# Patient Record
Sex: MALE
[De-identification: names, ages, dates, MRNs, and addresses within clinical notes are randomized per-mention and may not be internally consistent; named-entity substitution may affect disease eponyms.]

---

## 2019-02-04 ENCOUNTER — HOSPITAL ENCOUNTER (INPATIENT)
Dept: HOSPITAL 5 - ED | Age: 29
LOS: 1 days | Discharge: HOME | DRG: 918 | End: 2019-02-05
Attending: INTERNAL MEDICINE | Admitting: INTERNAL MEDICINE
Payer: SELF-PAY
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
## 2019-02-04 DIAGNOSIS — Z71.6: ICD-10-CM

## 2019-02-04 DIAGNOSIS — T40.601A: Primary | ICD-10-CM

## 2019-02-04 DIAGNOSIS — F17.210: ICD-10-CM

## 2019-02-04 LAB
ALBUMIN SERPL-MCNC: 4.2 G/DL (ref 3.9–5)
ALT SERPL-CCNC: 15 UNITS/L (ref 7–56)
BASOPHILS # (AUTO): 0.1 K/MM3 (ref 0–0.1)
BASOPHILS NFR BLD AUTO: 0.4 % (ref 0–1.8)
BENZODIAZEPINES SCREEN,URINE: (no result)
BUN SERPL-MCNC: 12 MG/DL (ref 9–20)
BUN/CREAT SERPL: 15 %
CALCIUM SERPL-MCNC: 8.6 MG/DL (ref 8.4–10.2)
EOSINOPHIL # BLD AUTO: 0.1 K/MM3 (ref 0–0.4)
EOSINOPHIL NFR BLD AUTO: 0.6 % (ref 0–4.3)
HCT VFR BLD CALC: 41.7 % (ref 35.5–45.6)
HEMOLYSIS INDEX: 5
HGB BLD-MCNC: 13.7 GM/DL (ref 11.8–15.2)
LYMPHOCYTES # BLD AUTO: 1.2 K/MM3 (ref 1.2–5.4)
LYMPHOCYTES NFR BLD AUTO: 9.2 % (ref 13.4–35)
MCHC RBC AUTO-ENTMCNC: 33 % (ref 32–34)
MCV RBC AUTO: 88 FL (ref 84–94)
METHADONE SCREEN,URINE: (no result)
MONOCYTES # (AUTO): 0.9 K/MM3 (ref 0–0.8)
MONOCYTES % (AUTO): 6.9 % (ref 0–7.3)
OPIATE SCREEN,URINE: (no result)
PLATELET # BLD: 170 K/MM3 (ref 140–440)
RBC # BLD AUTO: 4.76 M/MM3 (ref 3.65–5.03)

## 2019-02-04 PROCEDURE — 93010 ELECTROCARDIOGRAM REPORT: CPT

## 2019-02-04 PROCEDURE — 93005 ELECTROCARDIOGRAM TRACING: CPT

## 2019-02-04 PROCEDURE — G0480 DRUG TEST DEF 1-7 CLASSES: HCPCS

## 2019-02-04 PROCEDURE — 80320 DRUG SCREEN QUANTALCOHOLS: CPT

## 2019-02-04 PROCEDURE — 82550 ASSAY OF CK (CPK): CPT

## 2019-02-04 PROCEDURE — 80307 DRUG TEST PRSMV CHEM ANLYZR: CPT

## 2019-02-04 PROCEDURE — 93306 TTE W/DOPPLER COMPLETE: CPT

## 2019-02-04 PROCEDURE — 80053 COMPREHEN METABOLIC PANEL: CPT

## 2019-02-04 PROCEDURE — 85025 COMPLETE CBC W/AUTO DIFF WBC: CPT

## 2019-02-04 PROCEDURE — 36415 COLL VENOUS BLD VENIPUNCTURE: CPT

## 2019-02-04 PROCEDURE — 82553 CREATINE MB FRACTION: CPT

## 2019-02-04 PROCEDURE — 83735 ASSAY OF MAGNESIUM: CPT

## 2019-02-04 PROCEDURE — 84484 ASSAY OF TROPONIN QUANT: CPT

## 2019-02-04 PROCEDURE — 99406 BEHAV CHNG SMOKING 3-10 MIN: CPT

## 2019-02-04 RX ADMIN — DEXTROSE AND SODIUM CHLORIDE SCH MLS/HR: 5; .9 INJECTION, SOLUTION INTRAVENOUS at 21:58

## 2019-02-04 RX ADMIN — Medication SCH ML: at 21:59

## 2019-02-04 RX ADMIN — FAMOTIDINE SCH: 10 INJECTION, SOLUTION INTRAVENOUS at 21:59

## 2019-02-04 NOTE — EMERGENCY DEPARTMENT REPORT
HPI





- General


Chief Complaint: Overdose


Time Seen by Provider: 02/04/19 14:48





- HPI


HPI: 





Room 10





The patient is a 28-year-old male presenting with chief complaint of opiate 

overdose.  The patient states is prior to arrival he had bought one 30 mg 

oxycodone and crushed and snorted it.  Patient states began to have confused 

speech felt lightheaded.  The patient's brother states he became pale his lips 

turned purple the patient passed out.  EMS was called.  EMS administered Narcan 

the patient began to come to.  Patient had episode of vomiting with EMS but now 

denies complaints





Location: [See above]


Duration: [See above]


Quality:  [See above]


Severity:  [See above]


Modifying factors: [see above]


Context: [see above]


Mode of transportation: [not driving]





ED Past Medical Hx





- Past Medical History


Previous Medical History?: No





- Surgical History


Past Surgical History?: No





- Family History


Family history: no significant





- Social History


Smoking Status: Former Smoker


Substance Use Type: Other (history of opiate abuse)





ED Review of Systems


ROS: 


Stated complaint: OVERDOSE


Other details as noted in HPI





Constitutional: no symptoms reported


Eyes: denies: eye pain


ENT: denies: throat pain


Respiratory: no symptoms reported


Cardiovascular: denies: chest pain


Endocrine: no symptoms reported


Gastrointestinal: denies: abdominal pain


Genitourinary: denies: dysuria


Musculoskeletal: denies: back pain


Neurological: denies: headache





Physical Exam





- Physical Exam


Vital Signs: 


                                   Vital Signs











  02/04/19





  14:30


 


Temperature 97.4 F L


 


Pulse Rate 75


 


Respiratory 18





Rate 


 


Blood Pressure 119/67





[Right] 


 


O2 Sat by Pulse 96





Oximetry 











Physical Exam: 





GENERAL: The patient is well-developed well-nourished male lying on stretcher 

not appearing to be in acute distress. []


HEENT: Normocephalic.  Atraumatic.  Extraocular motions are intact.  Patient has

 moist mucous membranes.


NECK: Supple.  Trachea midline


CHEST/LUNGS: Clear to auscultation.  There is no respiratory distress noted.


HEART/CARDIOVASCULAR: Regular.  There is no tachycardia.  There is no gallop rub

 or murmur.


ABDOMEN: Abdomen is soft, nontender.  Patient has normal bowel sounds.  There is

 no abdominal distention.


SKIN: There is no rash.  There is no edema.  There is no diaphoresis.


NEURO: The patient is awake, alert, and oriented.  The patient is cooperative.  

The patient has no focal neurologic deficits.  The patient has normal speech.  

Cranial nerves II through XII grossly intact, no drift


MUSCULOSKELETAL:  There is no evidence of acute injury.





ED Course


                                   Vital Signs











  02/04/19





  14:30


 


Temperature 97.4 F L


 


Pulse Rate 75


 


Respiratory 18





Rate 


 


Blood Pressure 119/67





[Right] 


 


O2 Sat by Pulse 96





Oximetry 














ED Medical Decision Making





- Lab Data


Result diagrams: 


                                 02/04/19 15:05





                                 02/04/19 15:05





                                Laboratory Tests











  02/04/19 02/04/19





  15:05 15:05


 


WBC  13.0 H 


 


RBC  4.76 


 


Hgb  13.7 


 


Hct  41.7 


 


MCV  88 


 


MCH  29 


 


MCHC  33 


 


RDW  12.7 L 


 


Plt Count  170 


 


Lymph % (Auto)  9.2 L 


 


Mono % (Auto)  6.9 


 


Eos % (Auto)  0.6 


 


Baso % (Auto)  0.4 


 


Lymph #  1.2 


 


Mono #  0.9 H 


 


Eos #  0.1 


 


Baso #  0.1 


 


Seg Neutrophils %  82.9 H 


 


Seg Neutrophils #  10.7 H 


 


Sodium   141


 


Potassium   4.4


 


Chloride   105.2


 


Carbon Dioxide   28


 


Anion Gap   12


 


BUN   12


 


Creatinine   0.8


 


Estimated GFR   > 60


 


BUN/Creatinine Ratio   15


 


Glucose   115 H


 


Calcium   8.6


 


Total Bilirubin   0.30


 


AST   16


 


ALT   15


 


Alkaline Phosphatase   57


 


CK-MB (CK-2)   2.0


 


Troponin T   < 0.010


 


Total Protein   6.6


 


Albumin   4.2


 


Albumin/Globulin Ratio   1.8














- EKG Data


-: EKG Interpreted by Me


EKG shows normal: sinus rhythm


Rate: bradycardia (59 bpm)





- EKG Data


When compared to previous EKG there are: previous EKG unavailable


Interpretation: other (trigeminy)





- Differential Diagnosis


opiate overdose


Critical care attestation.: 


If time is entered above; I have spent that time in minutes in the direct care 

of this critically ill patient, excluding procedure time.








ED Disposition


Clinical Impression: 


 Opiate overdose, Trigeminy





Disposition: DC-09 OP ADMIT IP TO THIS HOSP


Is pt being admited?: Yes


Does the pt Need Aspirin: Yes


Condition: Fair


Time of Disposition: 16:40 (hospitalist paged (Dr Dumont))

## 2019-02-05 VITALS — DIASTOLIC BLOOD PRESSURE: 71 MMHG | SYSTOLIC BLOOD PRESSURE: 114 MMHG

## 2019-02-05 LAB
ALBUMIN SERPL-MCNC: 3.3 G/DL (ref 3.9–5)
ALT SERPL-CCNC: 14 UNITS/L (ref 7–56)
BASOPHILS # (AUTO): 0.1 K/MM3 (ref 0–0.1)
BASOPHILS NFR BLD AUTO: 0.9 % (ref 0–1.8)
BUN SERPL-MCNC: 9 MG/DL (ref 9–20)
BUN/CREAT SERPL: 11 %
CALCIUM SERPL-MCNC: 8.3 MG/DL (ref 8.4–10.2)
EOSINOPHIL # BLD AUTO: 0.2 K/MM3 (ref 0–0.4)
EOSINOPHIL NFR BLD AUTO: 2.6 % (ref 0–4.3)
HCT VFR BLD CALC: 40.8 % (ref 35.5–45.6)
HEMOLYSIS INDEX: 16
HGB BLD-MCNC: 13.2 GM/DL (ref 11.8–15.2)
LYMPHOCYTES # BLD AUTO: 2.4 K/MM3 (ref 1.2–5.4)
LYMPHOCYTES NFR BLD AUTO: 32.9 % (ref 13.4–35)
MCHC RBC AUTO-ENTMCNC: 33 % (ref 32–34)
MCV RBC AUTO: 88 FL (ref 84–94)
MONOCYTES # (AUTO): 0.8 K/MM3 (ref 0–0.8)
MONOCYTES % (AUTO): 10.6 % (ref 0–7.3)
PLATELET # BLD: 179 K/MM3 (ref 140–440)
RBC # BLD AUTO: 4.61 M/MM3 (ref 3.65–5.03)

## 2019-02-05 RX ADMIN — Medication SCH ML: at 10:39

## 2019-02-05 RX ADMIN — FAMOTIDINE SCH MG: 10 INJECTION, SOLUTION INTRAVENOUS at 10:37

## 2019-02-05 RX ADMIN — DEXTROSE AND SODIUM CHLORIDE SCH MLS/HR: 5; .9 INJECTION, SOLUTION INTRAVENOUS at 06:55

## 2019-02-05 NOTE — CONSULTATION
History of Present Illness


Consult date: 02/05/19


Requesting physician: KRISTIE PRUITT


Consult reason: other (trigeminy)


History of present illness: 


The patient is a 28-year-old male with no known significant past medical 

history. He presenting with chief complaint of altered mental status and 

"turning blue" following ingestion of one 30 mg oxycodone tablet which he vernell

hed and snorted. He states that he does not recall what occurred after he took 

the tablet, but his brother reported that pt became confused and turned blue and

thus EMS was called. EMS administered Narcan the patient regained consciousness.

Patient had episode of vomiting with EMS but now denies any current complaints. 

He was noted to have some trigeminy upon arrival and thus cardiology has been c

onsulted. Trigeminy has since resolved, pt in NSR on telemetry. 








Past History


Past Medical History: No medical history


Past Surgical History: No surgical history


Social history: smoking (former tobacco), prescription drug abuse





Medications and Allergies


                                    Allergies











Allergy/AdvReac Type Severity Reaction Status Date / Time


 


No Known Allergies Allergy   Unverified 02/04/19 14:38











                                Home Medications











 Medication  Instructions  Recorded  Confirmed  Last Taken  Type


 


No Known Home Medications [No  02/04/19 02/04/19 Unknown History





Reported Home Medications]     











Active Meds: 


Active Medications





Acetaminophen (Tylenol)  650 mg PO Q4H PRN


   PRN Reason: Pain MILD(1-3)/Fever >100.5/HA


Famotidine (Pepcid)  20 mg IV BID [facility]


   Last Admin: 02/04/19 21:59 Dose:  Not Given


   Documented by: 


Dextrose/Sodium Chloride (D5ns)  1,000 mls @ 100 mls/hr IV AS DIRECT [facility]


   Last Admin: 02/05/19 06:55 Dose:  100 mls/hr


   Documented by: 


Ibuprofen (Motrin)  600 mg PO Q6H PRN


   PRN Reason: Pain, Mild (1-3)


Ondansetron HCl (Zofran)  4 mg IV Q8H PRN


   PRN Reason: Nausea And Vomiting


Sodium Chloride (Sodium Chloride Flush Syringe 10 Ml)  10 ml IV BID [facility]


   Last Admin: 02/04/19 21:59 Dose:  10 ml


   Documented by: 


Sodium Chloride (Sodium Chloride Flush Syringe 10 Ml)  10 ml IV PRN PRN


   PRN Reason: LINE FLUSH











Review of Systems


Constitutional: no weight loss, no weight gain, no fever, no chills, no sweats


Ears, nose, mouth and throat: no ear pain, no nose pain, no sinus pressure, no 

sinus pain


Cardiovascular: lightheadedness, no chest pain, no orthopnea, no palpitations, 

no edema, no syncope, no shortness of breath, no dyspnea on exertion, no high 

blood pressure, no leg edema, no decreased exercise tolerance


Respiratory: no cough, no shortness of breath, no dyspnea on exertion, no 

congestion, no wheezing, no pain on inspiration


Gastrointestinal: vomiting, no abdominal pain, no nausea, no diarrhea, no 

constipation, no change in bowel habits


Genitourinary Male: no dysuria, no hematuria, no flank pain, no discharge, no 

urinary frequency, no urinary hesitancy


Musculoskeletal: no neck stiffness, no neck pain, no shooting arm pain, no arm 

numbness/tingling, no low back pain, no shooting leg pain


Integumentary: no rash, no pruritis, no redness, no sores, no wounds


Neurological: change in speech, change in mentation, no head injury, no 

paralysis, no weakness, no parathesias, no numbness, no tingling, no seizures, 

no syncope


Psychiatric: no anxiety


Endocrine: no cold intolerance, no heat intolerance


Hematologic/Lymphatic: no easy bruising, no easy bleeding


Allergic/Immunologic: no urticaria, no wheezing





Physical Examination


                                   Vital Signs











Temp Pulse Resp BP Pulse Ox


 


 97.4 F L  75   18   119/67   96 


 


 02/04/19 14:30  02/04/19 14:30  02/04/19 14:30  02/04/19 14:30  02/04/19 14:30











General appearance: no acute distress


HEENT: Positive: PERRL, Normocephaly, Mucus Membranes Moist


Neck: Positive: neck supple, trachea midline


Cardiac: Positive: Reg Rate and Rhythm, S1/S2


Lungs: Positive: clear to auscultation


Neuro: Positive: Grossly Intact


Abdomen: Positive: Soft.  Negative: Tender


Skin: Positive: Clear.  Negative: Rash, Wound


Musculoskeletal: No Pain


Extremities: Absent: edema





Results





                                 02/05/19 05:04





                                 02/05/19 05:04


                                 Cardiac Enzymes











  02/04/19 02/05/19 Range/Units





  15:05 05:04 


 


AST  16  14  (5-40)  units/L


 


CK-MB (CK-2)  2.0   (0.0-4.0)  ng/mL








                                       CBC











  02/04/19 02/05/19 Range/Units





  15:05 05:04 


 


WBC  13.0 H  7.2  (4.5-11.0)  K/mm3


 


RBC  4.76  4.61  (3.65-5.03)  M/mm3


 


Hgb  13.7  13.2  (11.8-15.2)  gm/dl


 


Hct  41.7  40.8  (35.5-45.6)  %


 


Plt Count  170  179  (140-440)  K/mm3


 


Lymph #  1.2  2.4  (1.2-5.4)  K/mm3


 


Mono #  0.9 H  0.8  (0.0-0.8)  K/mm3


 


Eos #  0.1  0.2  (0.0-0.4)  K/mm3


 


Baso #  0.1  0.1  (0.0-0.1)  K/mm3








                          Comprehensive Metabolic Panel











  02/04/19 02/05/19 Range/Units





  15:05 05:04 


 


Sodium  141  142  (137-145)  mmol/L


 


Potassium  4.4  4.0  (3.6-5.0)  mmol/L


 


Chloride  105.2  105.8  ()  mmol/L


 


Carbon Dioxide  28  27  (22-30)  mmol/L


 


BUN  12  9  (9-20)  mg/dL


 


Creatinine  0.8  0.8  (0.8-1.5)  mg/dL


 


Glucose  115 H  97  ()  mg/dL


 


Calcium  8.6  8.3 L  (8.4-10.2)  mg/dL


 


AST  16  14  (5-40)  units/L


 


ALT  15  14  (7-56)  units/L


 


Alkaline Phosphatase  57  53  ()  units/L


 


Total Protein  6.6  5.8 L  (6.3-8.2)  g/dL


 


Albumin  4.2  3.3 L  (3.9-5)  g/dL














- Imaging and Cardiology


Echo: pending


EKG: report reviewed, image reviewed





EKG interpretations





- Telemetry


EKG Rhythm: Sinus Rhythm





- EKG


Sinus rhythms and dysrhythmias: sinus rhythm





Assessment and Plan


Currently stable cardiac status. Trigeminy currently resolved. Obtain echo and 

serum Mg. Pending echo and electrolytes WNL, pt may discharge home today from 

cardiology standpoint. 





The patient has been seen in conjunction with Dr. Ortega who agrees with the 

assessment and plan of care.








- Patient Problems


(1) Opiate overdose


Current Visit: Yes   Status: Acute   





(2) Trigeminy


Current Visit: Yes   Status: Acute

## 2019-02-05 NOTE — HISTORY AND PHYSICAL REPORT
CHIEF COMPLAINT:  Opiate overdose.



HISTORY OF PRESENT ILLNESS:  A 28-year-old  male presents with opiate

overdose.  Apparently, he took 30 mg of oxycodone and snorted it.  The patient

began to feel confused and felt lightheaded.  His lips turned blue and he passed

out.  The EMS administered him Narcan when he started responding.



PAST MEDICAL HISTORY:  None.



PAST SURGICAL HISTORY:  None.



FAMILY HISTORY:  Hypertension.



SOCIAL HISTORY:  Former smoker.  History of opiate abuse.



REVIEW OF SYSTEMS:  Significant for decreased responsiveness from which he has

come back to near normal while in the Emergency Room.  No muscular twitching.



PHYSICAL EXAMINATION:

GENERAL:  Young male, cooperative during the examination.

VITAL SIGNS:  Temperature is 97.5, pulse is 94, respirations 20, sats 100%,

blood pressure 118/65.

HEENT:  Unremarkable.  Pupils equal and reactive.

NECK:  Supple, no lymphadenopathy, no thyromegaly.

LUNGS:  Clear to auscultation and percussion.  Good air entry.

CARDIOVASCULAR:  S1, S2 heard.  No gallop, no murmur, no rub.  Apical impulse in

left fifth intercostal space and midclavicular line.

ABDOMEN:  Soft and benign.  No hepatosplenomegaly.  No guarding, no rigidity. 

Hernial orifices are normal.

EXTREMITIES:  Good pedal pulses.  No pedal edema.

CENTRAL NERVOUS SYSTEM:  Decreased responsiveness at the time of admission to

the Emergency Room, recovered completely in the next couple of hours and then

while in the Emergency Room.



DIAGNOSTIC STUDIES:  EKG shows trigeminy.  Bradycardia of 59 per minute.  CAT

scan was not done.



LABORATORY DATA:  Near normal.  White count was slightly high at 13,000. 

Glucose 115.  Drug screen positive for none.  Electrolytes are normal.



ASSESSMENT AND PLAN:

1.  Opiate overdose secondary to snorting oxycodone 30 mg.  IV fluids for now. 

No true particular treatment.

2.  Ventricular trigeminy probably caused by snorting oxycodone.  The patient

counseled.  The patient to get a Cardiology consult.

3.  Deep venous thrombosis prophylaxis, Lovenox 40 mg subcutaneous daily.





DD: 02/05/2019 02:26

DT: 02/05/2019 03:28

JOB# [id number]  [id number]

M/NTS

## 2019-02-05 NOTE — DISCHARGE SUMMARY
Providers





- Providers


Date of Admission: 


02/04/19 16:54





Date of discharge: 02/05/19


Attending physician: 


AMY J KOCHERLA





                                        





02/04/19 21:16


Consult to Physician [CONS] Routine 


   Comment: 


   Consulting Provider: RANDI DAVIS


   Physician Instructions: 


   Reason For Exam: Trigeminy











Primary care physician: 


JYOTI LYLES








Hospitalization


Reason for admission: Altered mental status/cardiac arrhythmia


Condition: Stable


Pertinent studies: 


ECHO : EF 55-60%





Hospital course: 


 28-year-old male with no known significant past medical history was 

admittedthrough ER with c/o  altered mental status and "turning blue" following 

ingestion of one 30 mg oxycodone tablet which he crushed and snorted. after he 

took the tablet,  his brother reported that pt became confused and turned blue 

and thus EMS was called.Patient received Narcan and the patient regained 

consciousness. He was noted to have some trigeminy upon arrival which later 

converted to sinus . cardiology evaluated advised supportive care.


Todaypatientfeelsbetter,nonew complaints,vital signs stable


hemodynamically andclinically stablefor discharge.


Advised to quitrecreational drug use.





Discharge Diagnosis:


--Metabolic encephalopathy


--Drugabuse


--Cardiac arrhythmia


--Tobacco use


Disposition: DC-01 TO HOME OR SELFCARE


Time spent for discharge: 32 min





Core Measure Documentation





- Palliative Care


Palliative Care/ Comfort Measures: Not Applicable





- Core Measures


Any of the following diagnoses?: none





Exam





- Constitutional


Vitals: 


                                        











Temp Pulse Resp BP Pulse Ox


 


 98.8 F   79   20   114/71   97 


 


 02/05/19 12:44  02/05/19 12:44  02/05/19 12:44  02/05/19 12:44  02/05/19 12:44











General appearance: Present: no acute distress, well-nourished





- EENT


Eyes: Present: PERRL, EOM intact





- Neck


Neck: Present: supple, normal ROM





- Respiratory


Respiratory effort: normal


Respiratory: bilateral: diminished, negative: rales, rhonchi, wheezing





- Cardiovascular


Rhythm: regular


Heart Sounds: Present: S1 & S2





- Extremities


Extremities: no ischemia, No edema





- Abdominal


General gastrointestinal: Present: soft, non-tender, non-distended, normal bowel

 sounds





- Integumentary


Integumentary: Present: clear, warm





- Musculoskeletal


Musculoskeletal: strength equal bilaterally





- Psychiatric


Psychiatric: appropriate mood/affect, cooperative





- Neurologic


Neurologic: CNII-XII intact, moves all extremities





Plan


Activity: no restrictions


Diet: regular


Special Instructions: smoking cessation


Additional Instructions: Advised to quit recreational drug use.  Advised Smoking

 cessation


Follow up with: 


JYOTI LYLES MD [Primary Care Provider] - 3-5 Days


Prescriptions: 


RX: Nicotine [Habitrol] 14 mg TD DAILY #30 patch

## 2022-10-25 ENCOUNTER — HOSPITAL ENCOUNTER (EMERGENCY)
Age: 32
Discharge: HOME | End: 2022-10-25
Attending: EMERGENCY MEDICINE

## 2022-10-25 VITALS
HEIGHT: 74 IN | BODY MASS INDEX: 24.38 KG/M2 | HEART RATE: 78 BPM | TEMPERATURE: 98 F | OXYGEN SATURATION: 100 % | WEIGHT: 190 LBS | RESPIRATION RATE: 14 BRPM | DIASTOLIC BLOOD PRESSURE: 76 MMHG | SYSTOLIC BLOOD PRESSURE: 120 MMHG

## 2022-10-25 DIAGNOSIS — T40.601A OPIATE OVERDOSE, ACCIDENTAL OR UNINTENTIONAL, INITIAL ENCOUNTER (HCC): Primary | ICD-10-CM

## 2022-10-25 PROCEDURE — 99283 EMERGENCY DEPT VISIT LOW MDM: CPT | Performed by: EMERGENCY MEDICINE

## 2022-10-25 RX ORDER — NALOXONE HYDROCHLORIDE 4 MG/.1ML
1 SPRAY NASAL PRN
Qty: 1 EACH | Refills: 5 | Status: SHIPPED | OUTPATIENT
Start: 2022-10-25

## 2022-10-25 ASSESSMENT — PAIN - FUNCTIONAL ASSESSMENT: PAIN_FUNCTIONAL_ASSESSMENT: 0-10

## 2022-10-25 ASSESSMENT — PAIN DESCRIPTION - LOCATION: LOCATION: KNEE

## 2022-10-25 ASSESSMENT — PAIN DESCRIPTION - ORIENTATION: ORIENTATION: RIGHT;LEFT

## 2022-10-25 ASSESSMENT — PAIN SCALES - GENERAL: PAINLEVEL_OUTOF10: 2

## 2022-10-25 NOTE — Clinical Note
David Sorensen was seen and treated in our emergency department on 10/25/2022. He may return to work on 10/26/2022. If you have any questions or concerns, please don't hesitate to call.       Jamel Granger MD

## 2022-10-26 NOTE — DISCHARGE INSTRUCTIONS
Resources for Assistance with Substance Abuse      Jessica Marroquin  [address], [address] [place] [address]  [phone]   [email or web address]. org    Provides comprehensive treatment for alcohol and drug abuse and dependency, gender specific treatment for women and their children, and medication assisted treatment as well as case management services. Offers both intensive and non-intensive outpatient services and ambulatory detoxification for men and women, as well as non-medical residential treatment for men. Walk-in appointments are offered Monday through Thursday, starting at 11:00am on a first come-first serve basis. For all appointments - please be sure to bring proof of residency,   identification, proof of income (if applicable) and insurance card (if   applicable). (Examples include: State ID or s License with current  address or utility bill with name and current address)  Services are covered by Medicaid and many other insurance plans. Special assistance may be available for those who qualify. Locations:    Outpatient services for men @  [address] [facility] [facility] [address], [place]     Residential services for men @ [address] [place], [place]    Outpatient services for women @ [address] [place], [place]    Two additional transitional housing programs for women and children    Assessments   Assessments are conducted to gather the necessary information to provide customers with the most beneficial services plan available. They can take up to two hours. Upon completion, the customer and the therapist will develop a treatment plan that meets the customers needs. Chemical Dependency Education  Designed for individuals who have had consequences related to their drug use, but do not have a dependency diagnosis. The program meets once a week for two hours for four weeks. It teaches about the emotional, physical and mental aspects related substance use and abuse.  It also provides information and techniques to improve coping skills. Outpatient Treatment  Consists of a combination of services that total less than 9 hours per week. This program is typically designed for individuals who have had previous treatment and do not need intensive services and structure to assist with maintaining abstinence. Intensive Outpatient Treatment   Consists of 9 hours of services or more on a weekly basis designed to provide structure and in-depth counseling and information to help clients stabilize in an outpatient setting. Family Dependency Drug Court  [facility] in conjunction with Juvenile Court and Family and Childrens Services operate a [address] [place] [address] Program for clients who are at risk of losing custody of their children due to their substance use. Substance Abuse Violence Intervention  Offers an intensive program designed specifically for domestic violence offenders with substance abuse histories. The [facility] projects primary goal is to reduce the incidence of domestic violence by working hand-in-hand with community social service agencies, law enforcement, domestic violence offenders, and when appropriate, their families. The treatment is certified through [facility]    Criminal Justice Programs  [facility] has a alf therapist and  located in the [facility]. Judges and probation officers rely on the therapist to accurately assess and make recommendations for treatment for those individuals who are incarcerated in the alf. Referrals are often made directly from alf to residential treatment for both men and women. [facility] also has one therapist working within the [facility]. Substance Abuse treatment services are provided for ex-offenders returning to [facility] to assist with facilitating a successful transition back into the community.     In addition to the services listed above, Ashu Gregorio offers transitional housing for women and their children. For more information regarding transitional housing call [phone] ext. [phone]. Susie Pantoja 1772    Offers treatment for alcohol, drug, and tobacco addiction                      Families of [facility] ([address] [place] [address])  [facility] Support  350 [facility], [address]  http://www. foafamilies. org  Linc@[employer]. com   [phone]    A non-profit organization in [facility] that educates, empowers and embraces families, friends and individuals struggling with addiction to rebuild families and transform lives. Works to reduce the stigma of addiction, ensure availability of adequate treatment and recovery support services, as well as to influence public opinion and policy regarding the value of recovery. Holds weekly support meetings where families and individuals affected by addiction can come for support, friendship and education. The sharing of our experiences, strength and hope offer a pathway to peace. [facility][note 2] meets weekly on Tuesday evenings (6:30 to 8 pm) at [facility]. [address] [facility]3],[address]  [facility], [address] [place] [address]  [phone]    A transitional living center for men in early stages of substance abuse recovery. Purpose is to provide safe clean housing in a sober environment for men committed to their recovery. Assists residents in finding employment when necessary. Mental Health Services for [place] [address] a comprehensive array of mental health counseling and psychiatric services to adults, youth and children, including inpatient hospitalization. Limited primary health care is available to eligible clients. Provides alcohol and drug treatment services for adolescents. Adult Services  [address]  [address] [place], [place] [address]  [phone]  www. [facility]  [address], [address] [facility]  [address] Ira Ann  [phone]        For additional information and referrals, dial 2-1-1. Residents can obtain information at 2-1-1 about hospitals, doctors, the nearest food pantry, utility services, or a variety of other types of resources. The number is available for non-emergency assistance 24 hours a day. Alternate Numbers:    [facility]:  [phone]    [facility]:  [phone]    [facility]:  [phone]    Toll-Free:  [phone]                                                  Primary Care Physicians    [facility] Internal Medicine    Dr. Oleksandr Barroso. Lelia PARSON  [facility] Internal Med  [address], [place] [address]  1601 [facility] 400 [facility] Internal Med  5[address] [facility] [address] [facility] [address]   [phone]    [place]-Internal Medicine    Alyssa Cr MD  [facility] [address] [place] [address]  [address] and Peds. Fadia Marroquin MD  [address]  [address]. [place] [address]  [phone]    Ferdinand Devi. Lindsey Johnston CNP  [address]  [address]  [place], [address]  [address] [facility],[address] CNP   [address]  [address]  [place], [address]  [phone]    Chino Frost MD  [address]  [address]. Rufino Nealluis   [phone]     Juan Garcia MD  [address]  [address]. [place] Neal   [phone]    Cecile Chen PA-C  [address]  [address].   MagaliChris harris   [phone]    Primary Care Providers Magali Rosas MD  [address] , [address]  [phone]    Dr. Shyann Hinson MD   [address] , [address]  [phone]    Primary Care Providers Sidney Silva MD  [address] [place] [address] 2929 [facility]       Yuliya Sanders MD  [address] [place] [address], 1100 [facility]  1325 [facility]  [address] [facility] Roxo, 1100 [facility]  [phone]       Internal Med    Henry Brock, WILLIAM  [address], [address] [facility] MD  [address] [facility] [place], [address]  1305 [facility]. Kathy Lares [address]  [address]. Sadie Vasquez., 22 [facility] [address] [phone]  Same day and quick  care appointments  Mon.-Fri. 8 a.m.-8 p.m. Sat. 9 a.m.-1 p.m.

## 2022-10-26 NOTE — ED PROVIDER NOTES
[address] ENCOUNTER      Pt Name: Osiel Wolff  MRN: [id number]  [place] 1990  Date of evaluation: 10/25/2022  Provider: Paige Mccartney MD    CHIEF COMPLAINT       Chief Complaint   Patient presents with    Drug Overdose     given 6 mg Narcan prior to arrival         HISTORY OF PRESENT ILLNESS    HPI    Nursing Notes were reviewed. Osiel Wolff is a 28 y.o. male who presents to the emergency department after a possible heroin overdose    This is a 19-year-old man who comes the emergency department after being found unresponsive by EMS and provided with Narcan. The patient had arousal and retained consciousness after being administered Narcan in the field. The patient says that he does not do heroin or opiates regularly but has been having some back pain and received a pill of what he thought was fentanyl from a friend. He remembers then going to a gas station and the next thing he knows he was in an ambulance being transported to the hospital.  When to EMS, the patient was unresponsive with agonal breathing. He was reversed with Narcan in the field    Here in the emergency department, the patient says he is generally tired and has a mild headache but otherwise denies any symptoms. Denies nausea or vomiting. Denies recent fevers. Denies chest pain. Denies difficulty breathing. The patient denies that he had any intention of harming himself or overdosing. He says that he intended to treat his pain with some pills that he got from a friend and the overdose was accidental.    The patient is not entirely sure what was in the pill, however he believes it was supposed to be fentanyl. REVIEW OF SYSTEMS       Review of Systems    10 Systems were reviewed with this patient and all were negative with exception of those noted in the history of present illness above.       PAST MEDICAL HISTORY   No past medical history on file. SURGICAL HISTORY     No past surgical history on file. CURRENT MEDICATIONS       Previous Medications    No medications on file       ALLERGIES     Patient has no allergy information on record. FAMILY HISTORY     No family history on file. SOCIAL HISTORY       Social History     Socioeconomic History    Marital status: Single       SCREENINGS         Yaritza Coma Scale  Eye Opening: Spontaneous  Best Verbal Response: Oriented  Best Motor Response: Obeys commands  [place] Coma Scale Score: 15                     CIWA Assessment  BP: 120/76  Heart Rate: 78                 PHYSICAL EXAM       ED Triage Vitals [10/25/22 1854]   BP Temp Temp Source Heart Rate Resp SpO2 Height Weight   120/76 98 °F (36.7 °C) Oral 78 14 100 % 6' 2\" (1.88 m) 190 lb (86.2 kg)       Physical Exam  Vitals and nursing note reviewed. Constitutional:       General: He is not in acute distress. Appearance: Normal appearance. He is not ill-appearing, toxic-appearing or diaphoretic. HENT:      Head: Normocephalic. Nose: Nose normal.      Mouth/Throat:      Mouth: Mucous membranes are moist.   Eyes:      Extraocular Movements: Extraocular movements intact. Cardiovascular:      Rate and Rhythm: Normal rate and regular rhythm. Pulses: Normal pulses. Heart sounds: Normal heart sounds. Pulmonary:      Effort: Pulmonary effort is normal.      Breath sounds: Normal breath sounds. Abdominal:      Palpations: Abdomen is soft. Tenderness: There is no abdominal tenderness. Musculoskeletal:         General: Normal range of motion. Cervical back: Normal range of motion. Skin:     General: Skin is warm and dry. Capillary Refill: Capillary refill takes less than 2 seconds. Neurological:      General: No focal deficit present. Mental Status: He is alert and oriented to person, place, and time.    Psychiatric:         Mood and Affect: Mood normal.         Behavior: Behavior normal.       DIAGNOSTIC RESULTS       No orders to display       LABS:  Labs Reviewed   URINE DRUG SCREEN       All other labs were within normal range or not returned as of this dictation. EMERGENCY DEPARTMENT COURSE and DIFFERENTIAL DIAGNOSIS/MDM:   Vitals:    Vitals:    10/25/22 1854   BP: 120/76   Pulse: 78   Resp: 14   Temp: 98 °F (36.7 °C)   TempSrc: Oral   SpO2: 100%   Weight: 190 lb (86.2 kg)   Height: 6' 2\" (1.88 m)       MDM  After 3 hours of observation the emergency department, the patient has declined to provide urine for urinalysis or drug sample to identify the potential intoxicating substance. He was versed with Narcan and the presumption of opiate overdose remains likely. After 3 hours, the patient has been observed longer than the half life for presumed heroin and/or fentanyl and as a result I feel the patient is appropriate for discharge home. He will be given a prescription for naloxone. He has been given referral information for substance abuse. I recommend that he avoid narcotic use in the future and follow-up with his primary care physician and work on a substance abuse treatment plan. CONSULTS:  None    PROCEDURES:  Unless otherwise noted below, none     Procedures      FINAL IMPRESSION      1. Opiate overdose, accidental or unintentional, initial encounter [facility])          DISPOSITION/PLAN   DISPOSITION Decision To Discharge 10/25/2022 09:18:36 PM      PATIENT REFERRED TO:      Schedule an appointment as soon as possible for a visit in 3 days  Follow up within 3 days, Return to ED sooner if symptoms worsen      DISCHARGE MEDICATIONS:  New Prescriptions    NALOXONE 4 MG/0.1ML LIQD NASAL SPRAY    1 spray by Nasal route as needed for Opioid Reversal (May repeat 1 spray in alternate nostril every 2-3 minutes until medical assistance is available.)     Controlled Substances Monitoring:     No flowsheet data found.     Jamaal Loya MD (electronically signed)  Attending Emergency Physician            Jamaal Loya MD  10/25/22 3207